# Patient Record
Sex: MALE | Race: WHITE | NOT HISPANIC OR LATINO
[De-identification: names, ages, dates, MRNs, and addresses within clinical notes are randomized per-mention and may not be internally consistent; named-entity substitution may affect disease eponyms.]

---

## 2019-06-11 ENCOUNTER — TRANSCRIPTION ENCOUNTER (OUTPATIENT)
Age: 34
End: 2019-06-11

## 2019-11-14 ENCOUNTER — TRANSCRIPTION ENCOUNTER (OUTPATIENT)
Age: 34
End: 2019-11-14

## 2020-09-04 ENCOUNTER — APPOINTMENT (OUTPATIENT)
Dept: GASTROENTEROLOGY | Facility: CLINIC | Age: 35
End: 2020-09-04
Payer: COMMERCIAL

## 2020-09-04 VITALS
HEIGHT: 75 IN | TEMPERATURE: 98.2 F | BODY MASS INDEX: 20.39 KG/M2 | WEIGHT: 164 LBS | DIASTOLIC BLOOD PRESSURE: 80 MMHG | SYSTOLIC BLOOD PRESSURE: 110 MMHG | HEART RATE: 76 BPM

## 2020-09-04 DIAGNOSIS — Z83.3 FAMILY HISTORY OF DIABETES MELLITUS: ICD-10-CM

## 2020-09-04 DIAGNOSIS — K21.9 GASTRO-ESOPHAGEAL REFLUX DISEASE W/OUT ESOPHAGITIS: ICD-10-CM

## 2020-09-04 DIAGNOSIS — Z87.09 PERSONAL HISTORY OF OTHER DISEASES OF THE RESPIRATORY SYSTEM: ICD-10-CM

## 2020-09-04 DIAGNOSIS — S73.005A UNSPECIFIED DISLOCATION OF LEFT HIP, INITIAL ENCOUNTER: ICD-10-CM

## 2020-09-04 DIAGNOSIS — Z72.89 OTHER PROBLEMS RELATED TO LIFESTYLE: ICD-10-CM

## 2020-09-04 PROBLEM — Z00.00 ENCOUNTER FOR PREVENTIVE HEALTH EXAMINATION: Status: ACTIVE | Noted: 2020-09-04

## 2020-09-04 PROCEDURE — 99203 OFFICE O/P NEW LOW 30 MIN: CPT

## 2020-09-04 RX ORDER — ALBUTEROL 90 MCG
AEROSOL (GRAM) INHALATION
Refills: 0 | Status: ACTIVE | COMMUNITY

## 2020-09-04 RX ORDER — MULTIVITAMIN
TABLET ORAL DAILY
Refills: 0 | Status: ACTIVE | COMMUNITY

## 2020-09-04 RX ORDER — CETIRIZINE HCL 10 MG
10 TABLET ORAL DAILY
Refills: 0 | Status: ACTIVE | COMMUNITY

## 2020-09-04 NOTE — PHYSICAL EXAM
[General Appearance - In No Acute Distress] : in no acute distress [General Appearance - Alert] : alert [Sclera] : the sclera and conjunctiva were normal [Outer Ear] : the ears and nose were normal in appearance [Apical Impulse] : the apical impulse was normal [Neck Appearance] : the appearance of the neck was normal [] : no respiratory distress [Abdomen Tenderness] : non-tender [Abnormal Walk] : normal gait [Abdomen Soft] : soft [Cranial Nerves] : cranial nerves 2-12 were intact [Oriented To Time, Place, And Person] : oriented to person, place, and time [Skin Color & Pigmentation] : normal skin color and pigmentation

## 2020-09-05 PROBLEM — Z72.89 CONSUMES ALCOHOL: Status: ACTIVE | Noted: 2020-09-04

## 2020-09-05 PROBLEM — Z83.3 FAMILY HISTORY OF TYPE 2 DIABETES MELLITUS: Status: ACTIVE | Noted: 2020-09-04

## 2020-09-05 NOTE — HISTORY OF PRESENT ILLNESS
Left message to call office in regards to recent sleep study which demonstrated AHI of 70.   Will need titration study [FreeTextEntry1] : 35 year M PMH hip injury, scheduled for hip surgery later this month, presents for evaluation of epigastric pain\par Epigastric pain started April 2020. \par \par Started on PPI in May took it for 2-3 months, sx better but not resolved on PPI, stopped ppi and symptoms recurred although not as severe as prior. \par occurs after eating. 20 minutes, lasts for hours. worst after breakfast and lunch\par no n/v\par looser bowels on PPI\par in July started having allergy symptoms\par seeing allergist\par He took NSAIDS x 2 years for hip pain. Stopped when abdominal pain.\par intentional weight loss 5 lbs due to increased exercise\par \par No prior egd/colonoscopy\par Patient denies n/v/reflux, dysphagia/odynophagia, constipation, brbpr, melena.  Good appetite and energy level. Patient has daily BM, denies regular NSAID use. \par \par fam hx- maternal gm- death at age 65\par mom/dad- no colon polyps. \par \par \par [place] Deleon

## 2020-09-05 NOTE — ASSESSMENT
[FreeTextEntry1] : Epigastric pain- likely due to esophagitis/gastritis/PUD in light of some response to prior PPI trial. ?NSAID + h. pylori induced. \par -recommend EGD for further evaluation. \par \par Risks (including but not limited to sedation risks, infection, bleeding, perforation), benefits and alternatives to procedure, including not doing the procedure, were discussed with patient. Patient understood and agreed to proceed with EGD\par EGD preparation instructions reviewed with patient.\par \par trial of pepcid until EGD

## 2020-09-12 ENCOUNTER — RESULT REVIEW (OUTPATIENT)
Age: 35
End: 2020-09-12

## 2020-09-14 ENCOUNTER — RESULT REVIEW (OUTPATIENT)
Age: 35
End: 2020-09-14

## 2020-09-15 ENCOUNTER — APPOINTMENT (OUTPATIENT)
Dept: GASTROENTEROLOGY | Facility: HOSPITAL | Age: 35
End: 2020-09-15

## 2021-02-24 ENCOUNTER — APPOINTMENT (OUTPATIENT)
Dept: GASTROENTEROLOGY | Facility: CLINIC | Age: 36
End: 2021-02-24
Payer: COMMERCIAL

## 2021-02-24 VITALS
WEIGHT: 158 LBS | DIASTOLIC BLOOD PRESSURE: 60 MMHG | TEMPERATURE: 97.7 F | HEIGHT: 75 IN | BODY MASS INDEX: 19.65 KG/M2 | SYSTOLIC BLOOD PRESSURE: 96 MMHG

## 2021-02-24 DIAGNOSIS — R07.9 CHEST PAIN, UNSPECIFIED: ICD-10-CM

## 2021-02-24 PROCEDURE — 99072 ADDL SUPL MATRL&STAF TM PHE: CPT

## 2021-02-24 PROCEDURE — 99214 OFFICE O/P EST MOD 30 MIN: CPT

## 2021-02-25 PROBLEM — R07.9 CHEST PAIN, UNSPECIFIED TYPE: Status: ACTIVE | Noted: 2021-02-25

## 2021-02-25 NOTE — ASSESSMENT
[FreeTextEntry1] : Epigastric pain-?biliary colic, gastroparesis. recent EGD unrevealing. \par -small frequent meals\par -low fat diet\par -Abd US, if unrevealing will obtain CT scan. \par -continue Pepcid\par \par Chest pain -\par instructed patient to call PMD to discuss and go to ED if worsening.

## 2021-02-25 NOTE — HISTORY OF PRESENT ILLNESS
[FreeTextEntry1] : 35 year M PMH hip injury, s/p surgical repair 09/2020, GERD, presents for evaluation of epigastric pain\par Epigastric pain started April 2020. \par \par EGD 09/2020 for GERD\par minimal gastritis \par felt better on H2B\par recommended that he continue antireflux diet/lifestyle and Pepcid. \par \par ~ 7 weeks ago, epigastric pain started again. Initially, felt similar to pain he had in the spring.\par He tried PPI x 3 weeks, it did not help and then he developed significant eye tearing which he believes is a side effect of the medication and stopped it. He has continued on Pepcid. \par He continues to have pain associated with eating but also with not eating. He does not have pain at night. \par Appetite is decreased due to pain. feels that something is "stuck" at the top of his stomach. no dysphagia/odynophagia\par No change in bowel movements. no brbpr/melena\par no recent NSAIDS, no diet lifestyle changes\par He is in PT post hip surgery. \par He feels lh today, he has had some mild cp after doing PT 2 days ago and again today. \par \par fam hx- maternal gm- crc death at age 65\par mom/dad- no colon polyps. \par \par \par PMD: Guido Rick

## 2021-02-25 NOTE — REASON FOR VISIT
[Consultation] : a consultation visit [Follow-Up: _____] : a [unfilled] follow-up visit [FreeTextEntry1] : Patient seen at the request of Dr. Carol Long for the evaluation of epi gastric abdominal pain

## 2021-02-26 ENCOUNTER — RESULT REVIEW (OUTPATIENT)
Age: 36
End: 2021-02-26

## 2021-03-08 ENCOUNTER — RESULT REVIEW (OUTPATIENT)
Age: 36
End: 2021-03-08

## 2021-03-09 LAB
ALBUMIN SERPL ELPH-MCNC: 5.1 G/DL
ALP BLD-CCNC: 66 U/L
ALT SERPL-CCNC: 14 U/L
ANION GAP SERPL CALC-SCNC: 13 MMOL/L
AST SERPL-CCNC: 22 U/L
BASOPHILS # BLD AUTO: 0.07 K/UL
BASOPHILS NFR BLD AUTO: 1.2 %
BILIRUB SERPL-MCNC: 0.4 MG/DL
BUN SERPL-MCNC: 11 MG/DL
CALCIUM SERPL-MCNC: 9.8 MG/DL
CHLORIDE SERPL-SCNC: 103 MMOL/L
CO2 SERPL-SCNC: 26 MMOL/L
CREAT SERPL-MCNC: 0.88 MG/DL
CRP SERPL-MCNC: <3 MG/L
EOSINOPHIL # BLD AUTO: 0.12 K/UL
EOSINOPHIL NFR BLD AUTO: 2.1 %
ERYTHROCYTE [SEDIMENTATION RATE] IN BLOOD BY WESTERGREN METHOD: 2 MM/HR
GLUCOSE SERPL-MCNC: 90 MG/DL
HCT VFR BLD CALC: 45.5 %
HGB BLD-MCNC: 14.9 G/DL
IMM GRANULOCYTES NFR BLD AUTO: 0.2 %
LPL SERPL-CCNC: 23 U/L
LYMPHOCYTES # BLD AUTO: 1.8 K/UL
LYMPHOCYTES NFR BLD AUTO: 31 %
MAN DIFF?: NORMAL
MCHC RBC-ENTMCNC: 32.7 GM/DL
MCHC RBC-ENTMCNC: 33.3 PG
MCV RBC AUTO: 101.8 FL
MONOCYTES # BLD AUTO: 0.56 K/UL
MONOCYTES NFR BLD AUTO: 9.7 %
NEUTROPHILS # BLD AUTO: 3.24 K/UL
NEUTROPHILS NFR BLD AUTO: 55.8 %
PLATELET # BLD AUTO: 233 K/UL
POTASSIUM SERPL-SCNC: 4.3 MMOL/L
PROT SERPL-MCNC: 6.8 G/DL
RBC # BLD: 4.47 M/UL
RBC # FLD: 11.9 %
SODIUM SERPL-SCNC: 142 MMOL/L
WBC # FLD AUTO: 5.8 K/UL

## 2021-04-17 ENCOUNTER — RESULT REVIEW (OUTPATIENT)
Age: 36
End: 2021-04-17

## 2021-04-19 ENCOUNTER — RESULT REVIEW (OUTPATIENT)
Age: 36
End: 2021-04-19

## 2021-04-20 ENCOUNTER — APPOINTMENT (OUTPATIENT)
Dept: GASTROENTEROLOGY | Facility: HOSPITAL | Age: 36
End: 2021-04-20

## 2021-06-20 NOTE — REASON FOR VISIT
Patient: Manny Sarabia  CYSTOSCOPY, BILATERAL RETROGRADE PYELOGRAM,, BILATERAL URETEROSCOPY WITH LASER LITHOTRIPSY BILATERAL URETERAL STENT PLACEMENT, STONE EXTRACTION  Anesthesia type: general    Patient location: PACU  Last vitals:   Vitals:    10/04/18 1300   BP: 138/89   Pulse: 71   Resp: 17   Temp: 37.1  C (98.7  F)   SpO2: 96%     Post vital signs: stable  Level of consciousness: awake and responds to simple questions  Post-anesthesia pain: pain controlled  Post-anesthesia nausea and vomiting: no  Pulmonary: unassisted, return to baseline  Cardiovascular: stable and blood pressure at baseline  Hydration: adequate  Anesthetic events: no    QCDR Measures:  ASA# 11 - Edith-op Cardiac Arrest: ASA11B - Patient did NOT experience unanticipated cardiac arrest  ASA# 12 - Edith-op Mortality Rate: ASA12B - Patient did NOT die  ASA# 13 - PACU Re-Intubation Rate: ASA13B - Patient did NOT require a new airway mgmt  ASA# 10 - Composite Anes Safety: ASA10A - No serious adverse event    Additional Notes:   [Consultation] : a consultation visit [FreeTextEntry1] : Patient seen at the request of Dr. Carol Long for the evaluation of epi gastric abdominal pain

## 2021-07-26 ENCOUNTER — RESULT REVIEW (OUTPATIENT)
Age: 36
End: 2021-07-26

## 2021-07-27 ENCOUNTER — APPOINTMENT (OUTPATIENT)
Dept: GASTROENTEROLOGY | Facility: HOSPITAL | Age: 36
End: 2021-07-27
Payer: COMMERCIAL

## 2021-07-27 PROCEDURE — 45380 COLONOSCOPY AND BIOPSY: CPT | Mod: 59

## 2021-07-27 PROCEDURE — 45385 COLONOSCOPY W/LESION REMOVAL: CPT

## 2022-11-02 ENCOUNTER — APPOINTMENT (OUTPATIENT)
Dept: GASTROENTEROLOGY | Facility: CLINIC | Age: 37
End: 2022-11-02

## 2022-11-02 VITALS
OXYGEN SATURATION: 98 % | HEART RATE: 67 BPM | DIASTOLIC BLOOD PRESSURE: 62 MMHG | HEIGHT: 75 IN | WEIGHT: 165 LBS | SYSTOLIC BLOOD PRESSURE: 100 MMHG | BODY MASS INDEX: 20.51 KG/M2

## 2022-11-02 DIAGNOSIS — Z12.11 ENCOUNTER FOR SCREENING FOR MALIGNANT NEOPLASM OF COLON: ICD-10-CM

## 2022-11-02 DIAGNOSIS — R10.13 EPIGASTRIC PAIN: ICD-10-CM

## 2022-11-02 DIAGNOSIS — G89.29 EPIGASTRIC PAIN: ICD-10-CM

## 2022-11-02 DIAGNOSIS — R14.0 ABDOMINAL DISTENSION (GASEOUS): ICD-10-CM

## 2022-11-02 PROCEDURE — 99214 OFFICE O/P EST MOD 30 MIN: CPT

## 2022-11-02 RX ORDER — OFLOXACIN 3 MG/ML
0.3 SOLUTION/ DROPS OPHTHALMIC
Qty: 10 | Refills: 0 | Status: ACTIVE | COMMUNITY
Start: 2022-09-23

## 2022-11-02 RX ORDER — OSELTAMIVIR PHOSPHATE 75 MG/1
75 CAPSULE ORAL
Qty: 10 | Refills: 0 | Status: ACTIVE | COMMUNITY
Start: 2022-05-06

## 2022-11-02 RX ORDER — NIFEDIPINE 30 MG/1
30 TABLET, FILM COATED, EXTENDED RELEASE ORAL
Qty: 90 | Refills: 0 | Status: ACTIVE | COMMUNITY
Start: 2022-10-27

## 2022-11-02 RX ORDER — FAMOTIDINE 20 MG/1
20 TABLET, FILM COATED ORAL
Qty: 180 | Refills: 2 | Status: ACTIVE | COMMUNITY
Start: 2022-11-02 | End: 1900-01-01

## 2022-11-04 NOTE — HISTORY OF PRESENT ILLNESS
[FreeTextEntry1] : 37 year old M PMH hip injury, s/p surgical repair 09/2020, GERD, presents for evaluation of dyspepsia/bloating. \par \par \par Patient initially seen 09/2020 for epigastric pain starting after hip surgery, frequent NSAIDs, adverse reaction to ppi, good response to H2B \par \par EGD 09/2020 for GERD\par minimal gastritis \par felt better on H2B\par recommended that he continue antireflux diet/lifestyle and Pepcid. \par \par \par seen 02/2021 for abd pain , change in bowel habits. \par Abd US 02/2021:\par  IMPRESSION: Large amount of fecal material throughout normal caliber colon and multiple fluid-\par filled nondilated pelvic small bowel loops, without evidence of obstructing lesion.\par \par Ct A/P 03/2021:\par  IMPRESSION: Large amount of fecal material throughout normal caliber colon and multiple fluid-\par filled nondilated pelvic small bowel loops, without evidence of obstructing lesion.\par \par   \par \par Colonoscopy 04/2021:\par Ascending colon biopsies predominantly without significant pathologic change, however, minute separate fragment of neoplastic epithelium, probably representing a fragment of an adenoma. \par Sigmoid colon polyp, consistent with tubular adenoma. \par Recommend repeat colonoscopy within 3 months for further evaluation of possible polyp in ascending colon,\par \par Colonoscopy 08/2021 for h/o colon polyps\par \par 6 mm  villous adenoma, benign reactive lymphoid tissue and polyp without pathologic change. \par I recommend repeat colonoscopy in 1 year due to previous path report from 04/2021 with probable adenoma seen in ascending colon and villous adenoma removed during this colonoscopy.\par \par Today, he has mostly felt well on Pepcid daily until 08/2022 when he started to have dyspepsia again. This time with new sense of constant bloating that gets worse after eating. Feels that he gets indigestion after every meal and has to take tums after every meal. now 1 coffee per day. \par he is eating the same, still active, weight stable. no hb/regurgitation/\par bm normal, daily. small amount of BRB on tissue recently he attributes to hemorrhoids \par He is to start Nifedipine for Raynaud's today. He has labs with PMD scheduled later this week. He will forward results\par denies NSAID use.  \par \par fam hx- maternal gm- crc death at age 65\par mom/dad- no colon polyps. \par \par \par PMD: Guido Rick

## 2022-11-04 NOTE — PHYSICAL EXAM
[General Appearance - Alert] : alert [General Appearance - In No Acute Distress] : in no acute distress [Sclera] : the sclera and conjunctiva were normal [Outer Ear] : the ears and nose were normal in appearance [Neck Appearance] : the appearance of the neck was normal [] : no respiratory distress [Apical Impulse] : the apical impulse was normal [Abdomen Soft] : soft [Abnormal Walk] : normal gait [Skin Color & Pigmentation] : normal skin color and pigmentation [Cranial Nerves] : cranial nerves 2-12 were intact [Oriented To Time, Place, And Person] : oriented to person, place, and time [FreeTextEntry1] : + RUQ/epigastric  ttp , no r/g

## 2022-11-04 NOTE — ASSESSMENT
[FreeTextEntry1] : Dyspepsia /bloating, \par RUQ/epigastric ttp\par -patient to forward results of labs this week. \par -Abd US\par -EGD\par -increase pepcid to BID\par -trial of probiotic\par \par h/o colon polyps, patient due for screening. \par

## 2022-12-02 ENCOUNTER — RESULT REVIEW (OUTPATIENT)
Age: 37
End: 2022-12-02

## 2022-12-09 ENCOUNTER — RESULT REVIEW (OUTPATIENT)
Age: 37
End: 2022-12-09

## 2022-12-11 ENCOUNTER — RESULT REVIEW (OUTPATIENT)
Age: 37
End: 2022-12-11

## 2022-12-12 ENCOUNTER — APPOINTMENT (OUTPATIENT)
Dept: GASTROENTEROLOGY | Facility: HOSPITAL | Age: 37
End: 2022-12-12

## 2022-12-13 RX ORDER — HYDROCORTISONE 25 MG/G
2.5 CREAM TOPICAL TWICE DAILY
Qty: 14 | Refills: 0 | Status: ACTIVE | COMMUNITY
Start: 2022-12-13 | End: 1900-01-01

## 2022-12-19 ENCOUNTER — NON-APPOINTMENT (OUTPATIENT)
Age: 37
End: 2022-12-19

## 2023-09-12 RX ORDER — POLYETHYLENE GLYCOL 3350 AND ELECTROLYTES WITH LEMON FLAVOR 236; 22.74; 6.74; 5.86; 2.97 G/4L; G/4L; G/4L; G/4L; G/4L
236 POWDER, FOR SOLUTION ORAL
Qty: 1 | Refills: 0 | Status: ACTIVE | COMMUNITY
Start: 2023-09-12 | End: 1900-01-01

## 2023-09-20 ENCOUNTER — RESULT REVIEW (OUTPATIENT)
Age: 38
End: 2023-09-20

## 2023-09-21 ENCOUNTER — APPOINTMENT (OUTPATIENT)
Dept: GASTROENTEROLOGY | Facility: HOSPITAL | Age: 38
End: 2023-09-21

## 2023-09-22 DIAGNOSIS — K64.9 UNSPECIFIED HEMORRHOIDS: ICD-10-CM

## 2023-09-22 RX ORDER — HYDROCORTISONE 25 MG/G
2.5 CREAM TOPICAL TWICE DAILY
Qty: 1 | Refills: 0 | Status: ACTIVE | COMMUNITY
Start: 2023-09-22 | End: 1900-01-01

## 2024-11-20 DIAGNOSIS — Z12.11 ENCOUNTER FOR SCREENING FOR MALIGNANT NEOPLASM OF COLON: ICD-10-CM

## 2024-11-20 RX ORDER — POLYETHYLENE GLYCOL 3350 AND ELECTROLYTES WITH LEMON FLAVOR 236; 22.74; 6.74; 5.86; 2.97 G/4L; G/4L; G/4L; G/4L; G/4L
236 POWDER, FOR SOLUTION ORAL
Qty: 1 | Refills: 0 | Status: ACTIVE | COMMUNITY
Start: 2024-11-20 | End: 1900-01-01

## 2025-02-10 ENCOUNTER — APPOINTMENT (OUTPATIENT)
Dept: GASTROENTEROLOGY | Facility: HOSPITAL | Age: 40
End: 2025-02-10